# Patient Record
Sex: MALE | Race: WHITE
[De-identification: names, ages, dates, MRNs, and addresses within clinical notes are randomized per-mention and may not be internally consistent; named-entity substitution may affect disease eponyms.]

---

## 2021-03-18 ENCOUNTER — HOSPITAL ENCOUNTER (EMERGENCY)
Dept: HOSPITAL 76 - ED | Age: 41
Discharge: HOME | End: 2021-03-18
Payer: SELF-PAY

## 2021-03-18 VITALS — SYSTOLIC BLOOD PRESSURE: 120 MMHG | DIASTOLIC BLOOD PRESSURE: 72 MMHG

## 2021-03-18 DIAGNOSIS — K80.20: Primary | ICD-10-CM

## 2021-03-18 DIAGNOSIS — I10: ICD-10-CM

## 2021-03-18 DIAGNOSIS — K76.0: ICD-10-CM

## 2021-03-18 LAB
ALBUMIN DIAFP-MCNC: 4.4 G/DL (ref 3.2–5.5)
ALBUMIN/GLOB SERPL: 1.4 {RATIO} (ref 1–2.2)
ALP SERPL-CCNC: 73 IU/L (ref 42–121)
ALT SERPL W P-5'-P-CCNC: 81 IU/L (ref 10–60)
ANION GAP SERPL CALCULATED.4IONS-SCNC: 12 MMOL/L (ref 6–13)
AST SERPL W P-5'-P-CCNC: 34 IU/L (ref 10–42)
BASOPHILS NFR BLD AUTO: 0 10^3/UL (ref 0–0.1)
BASOPHILS NFR BLD AUTO: 0.6 %
BILIRUB BLD-MCNC: 1 MG/DL (ref 0.2–1)
BUN SERPL-MCNC: 22 MG/DL (ref 6–20)
CALCIUM UR-MCNC: 9.3 MG/DL (ref 8.5–10.3)
CHLORIDE SERPL-SCNC: 98 MMOL/L (ref 101–111)
CLARITY UR REFRACT.AUTO: CLEAR
CO2 SERPL-SCNC: 25 MMOL/L (ref 21–32)
CREAT SERPLBLD-SCNC: 1 MG/DL (ref 0.6–1.2)
EOSINOPHIL # BLD AUTO: 0.2 10^3/UL (ref 0–0.7)
EOSINOPHIL NFR BLD AUTO: 2.2 %
ERYTHROCYTE [DISTWIDTH] IN BLOOD BY AUTOMATED COUNT: 12.3 % (ref 12–15)
GFRSERPLBLD MDRD-ARVRAT: 82 ML/MIN/{1.73_M2} (ref 89–?)
GLOBULIN SER-MCNC: 3.2 G/DL (ref 2.1–4.2)
GLUCOSE SERPL-MCNC: 197 MG/DL (ref 70–100)
GLUCOSE UR QL STRIP.AUTO: NEGATIVE MG/DL
HCT VFR BLD AUTO: 40.6 % (ref 42–52)
HGB UR QL STRIP: 14.5 G/DL (ref 14–18)
KETONES UR QL STRIP.AUTO: NEGATIVE MG/DL
LIPASE SERPL-CCNC: 33 U/L (ref 22–51)
LYMPHOCYTES # SPEC AUTO: 1.8 10^3/UL (ref 1.5–3.5)
LYMPHOCYTES NFR BLD AUTO: 27.2 %
MCH RBC QN AUTO: 31.2 PG (ref 27–31)
MCHC RBC AUTO-ENTMCNC: 35.7 G/DL (ref 32–36)
MCV RBC AUTO: 87.3 FL (ref 80–94)
MONOCYTES # BLD AUTO: 0.4 10^3/UL (ref 0–1)
MONOCYTES NFR BLD AUTO: 5.8 %
NEUTROPHILS # BLD AUTO: 4.3 10^3/UL (ref 1.5–6.6)
NEUTROPHILS # SNV AUTO: 6.8 X10^3/UL (ref 4.8–10.8)
NEUTROPHILS NFR BLD AUTO: 63.9 %
NITRITE UR QL STRIP.AUTO: NEGATIVE
NRBC # BLD AUTO: 0 /100WBC
NRBC # BLD AUTO: 0 X10^3/UL
PDW BLD AUTO: 10.1 FL (ref 7.4–11.4)
PH UR STRIP.AUTO: 6.5 PH (ref 5–7.5)
PLATELET # BLD: 176 10^3/UL (ref 130–450)
POTASSIUM SERPL-SCNC: 3.6 MMOL/L (ref 3.5–5)
PROT SPEC-MCNC: 7.6 G/DL (ref 6.7–8.2)
PROT UR STRIP.AUTO-MCNC: >=300 MG/DL
RBC # UR STRIP.AUTO: (no result) /UL
RBC # URNS HPF: (no result) /HPF (ref 0–5)
RBC MAR: 4.65 10^6/UL (ref 4.7–6.1)
SODIUM SERPLBLD-SCNC: 135 MMOL/L (ref 135–145)
SP GR UR STRIP.AUTO: >=1.03 (ref 1–1.03)
SQUAMOUS URNS QL MICRO: (no result)
UROBILINOGEN UR QL STRIP.AUTO: (no result) E.U./DL
UROBILINOGEN UR STRIP.AUTO-MCNC: NEGATIVE MG/DL
WBC # UR MANUAL: (no result) /HPF (ref 0–3)

## 2021-03-18 PROCEDURE — 81001 URINALYSIS AUTO W/SCOPE: CPT

## 2021-03-18 PROCEDURE — 96374 THER/PROPH/DIAG INJ IV PUSH: CPT

## 2021-03-18 PROCEDURE — 96375 TX/PRO/DX INJ NEW DRUG ADDON: CPT

## 2021-03-18 PROCEDURE — 83690 ASSAY OF LIPASE: CPT

## 2021-03-18 PROCEDURE — 85025 COMPLETE CBC W/AUTO DIFF WBC: CPT

## 2021-03-18 PROCEDURE — 93005 ELECTROCARDIOGRAM TRACING: CPT

## 2021-03-18 PROCEDURE — 76705 ECHO EXAM OF ABDOMEN: CPT

## 2021-03-18 PROCEDURE — 81003 URINALYSIS AUTO W/O SCOPE: CPT

## 2021-03-18 PROCEDURE — 80053 COMPREHEN METABOLIC PANEL: CPT

## 2021-03-18 PROCEDURE — 87086 URINE CULTURE/COLONY COUNT: CPT

## 2021-03-18 PROCEDURE — 99284 EMERGENCY DEPT VISIT MOD MDM: CPT

## 2021-03-18 PROCEDURE — 96361 HYDRATE IV INFUSION ADD-ON: CPT

## 2021-03-18 PROCEDURE — 36415 COLL VENOUS BLD VENIPUNCTURE: CPT

## 2021-03-18 NOTE — ED PHYSICIAN DOCUMENTATION
PD HPI ABD PAIN





- Stated complaint


Stated Complaint: ADB PX, NAUSEA, VOMITING





- Chief complaint


Chief Complaint: Abd Pain





- History obtained from


History obtained from: Patient





- History of Present Illness


Timing - onset: How many hours ago (3)


Timing - duration: Hours


Timing - details: Abrupt onset, Constant, Waxing and waning


Pain level now: 10


Quality: Pain


Location: RUQ, Epigastric


Improved by: Other (no ameliorating factors)


Worsened by: Other (no exacerbating factors)


Associated symptoms: Nausea, Vomiting.  No: Fever, Diarrhea, Constipation


Similar symptoms before: No diagnosis


Recently seen: Not recently seen





- Additional information


Additional information: 





c/o sudden onset RUQ and epigastric pain. Onset was approximately 3 hours PTA 

while at home lying in bed awake. He says he has had similar pain before but 

self-limited episodes and thus has not been evaluated for this before. 





Review of Systems


Constitutional: denies: Fever, Chills, Sweats


Cardiac: reports: Reviewed and negative


Respiratory: reports: Reviewed and negative


GI: reports: Abdominal Pain, Nausea, Vomiting.  denies: Constipation, Diarrhea


: denies: Dysuria, Frequency


Skin: reports: Reviewed and negative


Musculoskeletal: reports: Reviewed and negative





PD PAST MEDICAL HISTORY





- Past Medical History


Past Medical History: Yes


Cardiovascular: Hypertension





- Past Surgical History


Past Surgical History: No





- Present Medications


Home Medications: 


                                Ambulatory Orders











 Medication  Instructions  Recorded  Confirmed


 


Lisinopril [Prinivil] 5 mg PO DAILY 03/18/21 03/18/21


 


Ondansetron Odt [Zofran] 4 mg TL Q6H PRN #10 tablet 03/18/21 


 


Oxycodone HCl/Acetaminophen 1 - 2 each PO Q6H PRN #14 tablet 03/18/21 





[Percocet 5-325 mg Tablet]   














- Allergies


Allergies/Adverse Reactions: 


                                    Allergies











Allergy/AdvReac Type Severity Reaction Status Date / Time


 


No Known Drug Allergies Allergy   Verified 03/18/21 01:51














- Living Situation


Living Arrangement: reports: At home





- Social History


Does the pt drink ETOH?: Yes


ETOH Use: Other (occasional)





PD ED PE NORMAL





- Vitals


Vital signs reviewed: Yes





- General


General: Alert and oriented X 3, Well developed/nourished, Other (obvious 

painful distress)





- Cardiac


Cardiac: RRR, No murmur





- Respiratory


Respiratory: No respiratory distress, Clear bilaterally





- Abdomen


Abdomen: Soft, Non tender, Non distended





- Back


Back: No CVA TTP





Results





- Vitals


Vitals: 


                               Vital Signs - 24 hr











  03/18/21 03/18/21 03/18/21





  01:40 02:15 02:31


 


Temperature 36.4 C L  


 


Heart Rate 65 67 74


 


Respiratory 20 24 24





Rate   


 


Blood Pressure 152/87 H 147/99 H 141/90 H


 


O2 Saturation 100 100 100














  03/18/21 03/18/21 03/18/21





  02:48 04:04 05:14


 


Temperature 36.2 C L  


 


Heart Rate 62 66 61


 


Respiratory 17 14 14





Rate   


 


Blood Pressure 141/90 H 112/63 120/72


 


O2 Saturation 100 98 98








                                     Oxygen











O2 Source                      Room air

















- Labs


Labs: 


                                Laboratory Tests











  03/18/21 03/18/21 03/18/21





  02:00 02:08 02:08


 


WBC   6.8 


 


RBC   4.65 L 


 


Hgb   14.5 


 


Hct   40.6 L 


 


MCV   87.3 


 


MCH   31.2 H 


 


MCHC   35.7 


 


RDW   12.3 


 


Plt Count   176 


 


MPV   10.1 


 


Neut # (Auto)   4.3 


 


Lymph # (Auto)   1.8 


 


Mono # (Auto)   0.4 


 


Eos # (Auto)   0.2 


 


Baso # (Auto)   0.0 


 


Absolute Nucleated RBC   0.00 


 


Nucleated RBC %   0.0 


 


Sodium    135


 


Potassium    3.6


 


Chloride    98 L


 


Carbon Dioxide    25


 


Anion Gap    12.0


 


BUN    22 H


 


Creatinine    1.0


 


Estimated GFR (MDRD)    82 L


 


Glucose    197 H


 


Calcium    9.3


 


Total Bilirubin    1.0


 


AST    34


 


ALT    81 H


 


Alkaline Phosphatase    73


 


Total Protein    7.6


 


Albumin    4.4


 


Globulin    3.2


 


Albumin/Globulin Ratio    1.4


 


Lipase    33


 


Urine Color  YELLOW  


 


Urine Clarity  CLEAR  


 


Urine pH  6.5  


 


Ur Specific Gravity  >=1.030 H  


 


Urine Protein  >=300 H  


 


Urine Glucose (UA)  NEGATIVE  


 


Urine Ketones  NEGATIVE  


 


Urine Occult Blood  MODERATE H  


 


Urine Nitrite  NEGATIVE  


 


Urine Bilirubin  NEGATIVE  


 


Urine Urobilinogen  0.2 (NORMAL)  


 


Ur Leukocyte Esterase  NEGATIVE  


 


Urine RBC  6-10 H  


 


Urine WBC  0-3  


 


Ur Squamous Epith Cells  NONE SEEN  


 


Urine Bacteria  None Seen  


 


Ur Microscopic Review  INDICATED  


 


Urine Culture Comments  NOT INDICATED  














- Rads (name of study)


  ** RUQ US


Radiology: Prelim report reviewed, See rad report





PD MEDICAL DECISION MAKING





- ED course


Complexity details: reviewed results, re-evaluated patient, considered 

differential, d/w patient


ED course: 





patient had no improvement with IV toradol but pain resolved with IV dilaudid 

1mg x single dose. He also received IV fluids (one liter NS) and IV zofran. H+P 

s/w biliary colic and US demonstrates multiple gallstones. he is afebrile and 

has normal LFTs. US findings do not suggest cholecystitis (normal common bile 

duct, no wall thickening or pericholecystic fluid). patient is appropriate for 

d/c with outpatient follow-up with surgery to discuss surgical option. He is 

encouraged to return to the emergency department if worse or if he develops 

concerning new signs/symptoms such as fever.





Departure





- Departure


Disposition: 01 Home, Self Care


Clinical Impression: 


 Biliary colic





Condition: Good


Instructions:  ED Gallstone W Biliary Colic


Follow-Up: 


DEEDEE Chandra MD [Provider Admit Priv/Credential] - 


Prescriptions: 


Oxycodone HCl/Acetaminophen [Percocet 5-325 mg Tablet] 1 - 2 each PO Q6H PRN #14

tablet


 PRN Reason: pain


Ondansetron Odt [Zofran] 4 mg TL Q6H PRN #10 tablet


 PRN Reason: Nausea / Vomiting


Discharge Date/Time: 03/18/21 05:23